# Patient Record
Sex: MALE | Race: WHITE | NOT HISPANIC OR LATINO | URBAN - METROPOLITAN AREA
[De-identification: names, ages, dates, MRNs, and addresses within clinical notes are randomized per-mention and may not be internally consistent; named-entity substitution may affect disease eponyms.]

---

## 2019-05-08 ENCOUNTER — EMERGENCY (EMERGENCY)
Facility: HOSPITAL | Age: 39
LOS: 1 days | Discharge: HOME | End: 2019-05-08
Attending: EMERGENCY MEDICINE | Admitting: EMERGENCY MEDICINE
Payer: SELF-PAY

## 2019-05-08 VITALS
DIASTOLIC BLOOD PRESSURE: 87 MMHG | HEART RATE: 100 BPM | TEMPERATURE: 98 F | SYSTOLIC BLOOD PRESSURE: 131 MMHG | RESPIRATION RATE: 18 BRPM | OXYGEN SATURATION: 100 %

## 2019-05-08 VITALS
HEART RATE: 76 BPM | TEMPERATURE: 99 F | SYSTOLIC BLOOD PRESSURE: 128 MMHG | RESPIRATION RATE: 16 BRPM | OXYGEN SATURATION: 100 % | DIASTOLIC BLOOD PRESSURE: 72 MMHG

## 2019-05-08 LAB
BASOPHILS # BLD AUTO: 0.07 K/UL — SIGNIFICANT CHANGE UP (ref 0–0.2)
BASOPHILS NFR BLD AUTO: 0.7 % — SIGNIFICANT CHANGE UP (ref 0–2)
EOSINOPHIL # BLD AUTO: 0.24 K/UL — SIGNIFICANT CHANGE UP (ref 0–0.5)
EOSINOPHIL NFR BLD AUTO: 2.3 % — SIGNIFICANT CHANGE UP (ref 0–6)
HCT VFR BLD CALC: 47.4 % — SIGNIFICANT CHANGE UP (ref 39–50)
HGB BLD-MCNC: 15.2 G/DL — SIGNIFICANT CHANGE UP (ref 13–17)
IMM GRANULOCYTES NFR BLD AUTO: 0.5 % — SIGNIFICANT CHANGE UP (ref 0–1.5)
LYMPHOCYTES # BLD AUTO: 19.3 % — SIGNIFICANT CHANGE UP (ref 13–44)
LYMPHOCYTES # BLD AUTO: 2.04 K/UL — SIGNIFICANT CHANGE UP (ref 1–3.3)
MCHC RBC-ENTMCNC: 29.2 PG — SIGNIFICANT CHANGE UP (ref 27–34)
MCHC RBC-ENTMCNC: 32.1 % — SIGNIFICANT CHANGE UP (ref 32–36)
MCV RBC AUTO: 91 FL — SIGNIFICANT CHANGE UP (ref 80–100)
MONOCYTES # BLD AUTO: 0.74 K/UL — SIGNIFICANT CHANGE UP (ref 0–0.9)
MONOCYTES NFR BLD AUTO: 7 % — SIGNIFICANT CHANGE UP (ref 2–14)
NEUTROPHILS # BLD AUTO: 7.42 K/UL — HIGH (ref 1.8–7.4)
NEUTROPHILS NFR BLD AUTO: 70.2 % — SIGNIFICANT CHANGE UP (ref 43–77)
NRBC # FLD: 0 K/UL — SIGNIFICANT CHANGE UP (ref 0–0)
PLATELET # BLD AUTO: 262 K/UL — SIGNIFICANT CHANGE UP (ref 150–400)
PMV BLD: 10.6 FL — SIGNIFICANT CHANGE UP (ref 7–13)
RBC # BLD: 5.21 M/UL — SIGNIFICANT CHANGE UP (ref 4.2–5.8)
RBC # FLD: 13 % — SIGNIFICANT CHANGE UP (ref 10.3–14.5)
WBC # BLD: 10.56 K/UL — HIGH (ref 3.8–10.5)
WBC # FLD AUTO: 10.56 K/UL — HIGH (ref 3.8–10.5)

## 2019-05-08 PROCEDURE — 99053 MED SERV 10PM-8AM 24 HR FAC: CPT

## 2019-05-08 PROCEDURE — 99283 EMERGENCY DEPT VISIT LOW MDM: CPT | Mod: 25

## 2019-05-08 PROCEDURE — 93010 ELECTROCARDIOGRAM REPORT: CPT

## 2019-05-08 RX ORDER — SODIUM CHLORIDE 9 MG/ML
1000 INJECTION INTRAMUSCULAR; INTRAVENOUS; SUBCUTANEOUS ONCE
Qty: 0 | Refills: 0 | Status: COMPLETED | OUTPATIENT
Start: 2019-05-08 | End: 2019-05-08

## 2019-05-08 RX ADMIN — SODIUM CHLORIDE 1000 MILLILITER(S): 9 INJECTION INTRAMUSCULAR; INTRAVENOUS; SUBCUTANEOUS at 23:03
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
## 2019-05-08 NOTE — ED PROVIDER NOTE - PROGRESS NOTE DETAILS
Pt was observed for 6 hours, was reassessed, feeling better, able to ambulate without any assistance, stable and ready to be discharged.
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
## 2019-05-08 NOTE — ED ADULT NURSE NOTE - NSIMPLEMENTINTERV_GEN_ALL_ED
Implemented All Universal Safety Interventions:  [place] to call system. Call bell, personal items and telephone within reach. Instruct patient to call for assistance. Room bathroom lighting operational. Non-slip footwear when patient is off stretcher. Physically safe environment: no spills, clutter or unnecessary equipment. Stretcher in lowest position, wheels locked, appropriate side rails in place.
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
## 2019-05-09 LAB
ALBUMIN SERPL ELPH-MCNC: 4.1 G/DL — SIGNIFICANT CHANGE UP (ref 3.3–5)
ALP SERPL-CCNC: 65 U/L — SIGNIFICANT CHANGE UP (ref 40–120)
ALT FLD-CCNC: 54 U/L — HIGH (ref 4–41)
ANION GAP SERPL CALC-SCNC: 13 MMO/L — SIGNIFICANT CHANGE UP (ref 7–14)
APAP SERPL-MCNC: < 15 UG/ML — LOW (ref 15–25)
AST SERPL-CCNC: 28 U/L — SIGNIFICANT CHANGE UP (ref 4–40)
BILIRUB SERPL-MCNC: < 0.2 MG/DL — LOW (ref 0.2–1.2)
BUN SERPL-MCNC: 23 MG/DL — SIGNIFICANT CHANGE UP (ref 7–23)
CALCIUM SERPL-MCNC: 9.4 MG/DL — SIGNIFICANT CHANGE UP (ref 8.4–10.5)
CHLORIDE SERPL-SCNC: 108 MMOL/L — HIGH (ref 98–107)
CO2 SERPL-SCNC: 22 MMOL/L — SIGNIFICANT CHANGE UP (ref 22–31)
CREAT SERPL-MCNC: 1.04 MG/DL — SIGNIFICANT CHANGE UP (ref 0.5–1.3)
ETHANOL BLD-MCNC: < 10 MG/DL — SIGNIFICANT CHANGE UP
GLUCOSE SERPL-MCNC: 135 MG/DL — HIGH (ref 70–99)
MAGNESIUM SERPL-MCNC: 2.1 MG/DL — SIGNIFICANT CHANGE UP (ref 1.6–2.6)
PHOSPHATE SERPL-MCNC: 3.7 MG/DL — SIGNIFICANT CHANGE UP (ref 2.5–4.5)
POTASSIUM SERPL-MCNC: 4.6 MMOL/L — SIGNIFICANT CHANGE UP (ref 3.5–5.3)
POTASSIUM SERPL-SCNC: 4.6 MMOL/L — SIGNIFICANT CHANGE UP (ref 3.5–5.3)
PROT SERPL-MCNC: 7.4 G/DL — SIGNIFICANT CHANGE UP (ref 6–8.3)
SALICYLATES SERPL-MCNC: < 5 MG/DL — LOW (ref 15–30)
SODIUM SERPL-SCNC: 143 MMOL/L — SIGNIFICANT CHANGE UP (ref 135–145)

## 2023-07-19 ENCOUNTER — EMERGENCY (EMERGENCY)
Facility: HOSPITAL | Age: 43
LOS: 1 days | Discharge: HOME | End: 2023-07-19
Payer: COMMERCIAL

## 2023-07-19 VITALS
WEIGHT: 315 LBS | HEART RATE: 101 BPM | TEMPERATURE: 98 F | OXYGEN SATURATION: 96 % | DIASTOLIC BLOOD PRESSURE: 83 MMHG | RESPIRATION RATE: 20 BRPM | SYSTOLIC BLOOD PRESSURE: 130 MMHG

## 2023-07-19 VITALS
DIASTOLIC BLOOD PRESSURE: 78 MMHG | TEMPERATURE: 98 F | OXYGEN SATURATION: 98 % | SYSTOLIC BLOOD PRESSURE: 119 MMHG | HEART RATE: 84 BPM | RESPIRATION RATE: 18 BRPM

## 2023-07-19 PROBLEM — I10 ESSENTIAL (PRIMARY) HYPERTENSION: Chronic | Status: ACTIVE | Noted: 2019-05-08

## 2023-07-19 PROCEDURE — 99283 EMERGENCY DEPT VISIT LOW MDM: CPT

## 2023-07-19 PROCEDURE — 99284 EMERGENCY DEPT VISIT MOD MDM: CPT

## 2023-07-19 RX ORDER — ACETAMINOPHEN 500 MG
975 TABLET ORAL ONCE
Refills: 0 | Status: COMPLETED | OUTPATIENT
Start: 2023-07-19 | End: 2023-07-19

## 2023-07-19 RX ORDER — IBUPROFEN 200 MG
600 TABLET ORAL ONCE
Refills: 0 | Status: COMPLETED | OUTPATIENT
Start: 2023-07-19 | End: 2023-07-19

## 2023-07-19 RX ORDER — LIDOCAINE 4 G/100G
1 CREAM TOPICAL ONCE
Refills: 0 | Status: COMPLETED | OUTPATIENT
Start: 2023-07-19 | End: 2023-07-19

## 2023-07-19 RX ADMIN — Medication 600 MILLIGRAM(S): at 17:06

## 2023-07-19 RX ADMIN — Medication 975 MILLIGRAM(S): at 17:05

## 2023-07-19 RX ADMIN — LIDOCAINE 1 PATCH: 4 CREAM TOPICAL at 17:08

## 2023-07-19 NOTE — ED ADULT NURSE NOTE - NSFALLUNIVINTERV_ED_ALL_ED
Bed/Stretcher in lowest position, wheels locked, appropriate side rails in place/Call bell, personal items and telephone in reach/Instruct patient to call for assistance before getting out of bed/chair/stretcher/Non-slip footwear applied when patient is off stretcher/[place] to call system/Physically safe environment - no spills, clutter or unnecessary equipment/Purposeful proactive rounding/Room/bathroom lighting operational, light cord in reach
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
## 2023-07-19 NOTE — ED ADULT NURSE NOTE - NSFALLRISKASMTTYPE_ED_ALL_ED
Returned patient phone call and left detailed voicemail message regarding rescheduling Botox appointment.   Initial (On Arrival)
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
## 2023-07-19 NOTE — ED PROVIDER NOTE - PHYSICAL EXAMINATION
CONSTITUTIONAL: Patient is awake, alert and oriented x 3. Patient is well appearing and in no acute distress  HEAD: NCAT  EYES: PERRL b/l, EOMI  NECK: supple, FROM  LUNGS: CTA b/l, no wheezing or rales. No ttp to the anterior, lateral or posterior chest wall   HEART: RRR.+S1S2 no murmurs  ABDOMEN: Soft, non-distended, nttp, no rebound or guarding  EXTREMITY: FROM upper and lower ext b/l. No midline cervical, thoracic or lumbar ttp. There is b/l paraspinal cervical and lumbar ttp   SKIN: with no rash or lesions  NEURO: Cn3-12 grossly intact. Strength 5/5 UE/LE b/l. Nml gross sensation UE/LE b/l

## 2023-07-19 NOTE — ED PROVIDER NOTE - NSFOLLOWUPINSTRUCTIONS_ED_ALL_ED_FT
**YOU HAVE DECIDED TO SIGN OUT AGAINST MEDICAL ADVISE**    1. Please follow up with your Primary Care Doctor after discharge, bring a copy of your results to follow up appointment     2. Please rest, stay hydrated and continue all at home medications as previously prescribed    3. For continued or recurrent pain recommend taking over the counter Tylenol (acetaminophen) 1000 mg every 6-8 hours as needed and/or over the counter Motrin (Ibuprofen/Advil) 600mg every 6 hours as needed. For additional relief of your pain your may use over the counter Lidocaine patches such as Salonpas.     4. Call [facility] - [phone] for further evaluation and management of your back pain     5. We have reached out to our care coordinators to help schedule appointment and you should expect a call in the next 24-48 hours to expedite appointment     6. Return to ED for any new or worsened symptoms of concern

## 2023-07-19 NOTE — ED PROVIDER NOTE - PATIENT PORTAL LINK FT
You can access the FollowMyHealth Patient Portal offered by [facility] by registering at the following website: [email or web address]. By joining [facility]’s FollowMyHealth portal, you will also be able to view your health information using other applications (apps) compatible with our system.

## 2023-07-19 NOTE — ED PROVIDER NOTE - PROGRESS NOTE DETAILS
Pt refusing to be examined by attending. Requesting paperwork, will sign out AMA.   The patient is AAOx3, not intoxicated, and displays normal decision making ability. We discussed all risks, benefits, and alternatives to the progression of treatment and the potential dangers of leaving including but not limited to permanent disability, injury, and death.  The patient was instructed that they are welcome to change their decision to leave against medical advice and return to the emergency department at any time and for any reason in order to allow us to render care.   Leanne Ring PA-C
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
## 2024-03-28 ENCOUNTER — EMERGENCY (EMERGENCY)
Facility: HOSPITAL | Age: 44
LOS: 1 days | Discharge: HOME | End: 2024-03-28
Admitting: EMERGENCY MEDICINE
Payer: COMMERCIAL

## 2024-03-28 VITALS
HEART RATE: 89 BPM | DIASTOLIC BLOOD PRESSURE: 107 MMHG | RESPIRATION RATE: 18 BRPM | OXYGEN SATURATION: 96 % | TEMPERATURE: 98 F | SYSTOLIC BLOOD PRESSURE: 163 MMHG

## 2024-03-28 PROCEDURE — 99283 EMERGENCY DEPT VISIT LOW MDM: CPT
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
## 2024-03-28 NOTE — ED PROVIDER NOTE - PATIENT PORTAL LINK FT
You can access the FollowMyHealth Patient Portal offered by [facility] by registering at the following website: [email or web address]. By joining [employer]’s FollowMyHealth portal, you will also be able to view your health information using other applications (apps) compatible with our system.
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
## 2024-05-15 ENCOUNTER — EMERGENCY (EMERGENCY)
Facility: HOSPITAL | Age: 44
LOS: 1 days | Discharge: HOME | End: 2024-05-15
Admitting: EMERGENCY MEDICINE
Payer: COMMERCIAL

## 2024-05-15 VITALS
SYSTOLIC BLOOD PRESSURE: 136 MMHG | TEMPERATURE: 99 F | RESPIRATION RATE: 18 BRPM | HEART RATE: 89 BPM | OXYGEN SATURATION: 99 % | DIASTOLIC BLOOD PRESSURE: 76 MMHG

## 2024-05-15 PROCEDURE — 99284 EMERGENCY DEPT VISIT MOD MDM: CPT

## 2024-05-15 RX ORDER — LIDOCAINE 4 G/100G
1 CREAM TOPICAL ONCE
Refills: 0 | Status: COMPLETED | OUTPATIENT
Start: 2024-05-15 | End: 2024-05-15

## 2024-05-15 RX ORDER — IBUPROFEN 200 MG
600 TABLET ORAL ONCE
Refills: 0 | Status: COMPLETED | OUTPATIENT
Start: 2024-05-15 | End: 2024-05-15

## 2024-05-15 RX ADMIN — Medication 600 MILLIGRAM(S): at 22:56

## 2024-05-15 RX ADMIN — LIDOCAINE 1 PATCH: 4 CREAM TOPICAL at 22:56

## 2024-05-15 NOTE — ED ADULT NURSE NOTE - IN THE PAST 12 MONTHS HAVE YOU USED DRUGS OTHER THAN THOSE REQUIRED FOR MEDICAL REASON?
The After Visit Summary was reviewed with the patient following their office visit with Rachna Holt PA-C. Patient was educated about any medication changes, the importance of following a low sodium diet (given low-sodium food resources and recipe book), importance of recording daily weights, and when to call [facility] clinic- he was advised to call on Friday, 10/26, with wt/sx update. Also advised pt to call if he has any issues getting colonoscopy scheduled sooner, which Rachna Holt PA-C recommends. Patient verbalized understanding of the information and agrees to call with any questions or concerns.     Labs: Lab results from today were reviewed with the patient during the office visit. A copy of the results were provided on the After Visit Summary.     Return appointment: Patient was scheduled for lab appointment on 10/29/18 at 9:00am for Hgb recheck and 2 week follow up with Rachna Holt PA-C on 11/6/18 at 2:10pm for lab and 3:10pm for [facility] visit.    Sun Walker RN  [facility] Care Coordinator          
No
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
## 2024-05-15 NOTE — ED PROVIDER NOTE - NSFOLLOWUPINSTRUCTIONS_ED_ALL_ED_FT
Follow up with your Doctor in 1-2 days.    Heat to back.    Take Ibuprofen 400mg orally every 6 hours as needed for pain take with food.    Return to the ER for any persistent/worsening or new symptoms, weakness, numbness, difficulty urinating or any concerning symptoms.
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
## 2024-05-15 NOTE — ED PROVIDER NOTE - PATIENT PORTAL LINK FT
You can access the FollowMyHealth Patient Portal offered by [facility] by registering at the following website: [email or web address]. By joining [employer]s FollowMyHealth portal, you will also be able to view your health information using other applications (apps) compatible with our system.

## 2024-05-15 NOTE — ED PROVIDER NOTE - PHYSICAL EXAMINATION
+TTP right sided paraspinal cervical region, no midline TTP, neck with FROM, strength 5/5 all extremities, sensation equal and intact.
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
## 2025-06-02 ENCOUNTER — EMERGENCY (EMERGENCY)
Facility: HOSPITAL | Age: 45
LOS: 1 days | End: 2025-06-02
Admitting: STUDENT IN AN ORGANIZED HEALTH CARE EDUCATION/TRAINING PROGRAM
Payer: OTHER MISCELLANEOUS

## 2025-06-02 VITALS
WEIGHT: 315 LBS | DIASTOLIC BLOOD PRESSURE: 69 MMHG | RESPIRATION RATE: 18 BRPM | HEART RATE: 59 BPM | SYSTOLIC BLOOD PRESSURE: 125 MMHG | OXYGEN SATURATION: 98 % | TEMPERATURE: 98 F

## 2025-06-02 PROCEDURE — 99283 EMERGENCY DEPT VISIT LOW MDM: CPT

## 2025-06-02 NOTE — ED PROVIDER NOTE - PATIENT PORTAL LINK FT
You can access the FollowMyHealth Patient Portal offered by [facility] by registering at the following website: [email or web address]. By joining [employer]’s FollowMyHealth portal, you will also be able to view your health information using other applications (apps) compatible with our system.

## 2025-06-02 NOTE — ED PROVIDER NOTE - PHYSICAL EXAMINATION
CONSTITUTIONAL: Well-appearing; well-nourished; in no apparent distress;  HEAD: Normocephalic, atraumatic;  EYES: PERRL, EOM intact, conjunctiva and sclera WNL;  NECK/LYMPH: Supple; non-tender;  CARD: Normal S1, S2; no murmurs, rubs, or gallops noted  RESP: Normal chest excursion with respiration; breath sounds clear and equal bilaterally; no wheezes, rhonchi, or rales noted  EXT/MS: no visible deformity, no erythema/bruising. + ttp along IT band of R lower extremity. no bony ttp of R hip. no midline c/t/l spine ttp. moves all extremities; distal pulses are normal, no pedal edema  SKIN: Normal for age and race; warm; dry; good turgor; no apparent lesions or exudate noted  NEURO: Awake, alert, oriented x 3, no gross deficits, CN II-XII grossly intact, no motor or sensory deficit noted; ambulatory with steady gait.  PSYCH: Normal mood; appropriate affect

## 2025-06-02 NOTE — ED ADULT TRIAGE NOTE - CHIEF COMPLAINT QUOTE
Pt is c/o right leg and lower back and neck pain since 1pm after a fall from the  truck while at work. Denies hitting head or LOC. Offers no other complaints.

## 2025-06-02 NOTE — ED PROVIDER NOTE - NSFOLLOWUPINSTRUCTIONS_ED_ALL_ED_FT
You were seen in our department for Right lower extremity pain  it is likely a contusion of your IT band of your leg.  Rest, Ice (first 72 hours, then heat), compress, elevate.   Take Tylenol up to 1000mg every 4-6 hours as needed for mild pain.  Take Ibuprofen 400mg, or may take max 600mg, every 6-8 hours with food as needed for moderate pain.  Follow up with your PMD in 48-72 hours for further monitoring.  Follow up with an orthopedic within 1 week for further evaluation.  If you have any difficulty obtaining an appointment with a consultant/specialist, please call [phone] to speak with a discharge coordinator for assistance.   if you develop any chest pain, dizziness, high fevers, weakness, numbness, tingling, vision changes, or any worsening symptoms return to our ED for evaluation.

## 2025-06-02 NOTE — ED PROVIDER NOTE - CLINICAL SUMMARY MEDICAL DECISION MAKING FREE TEXT BOX
44yoM PMH HTN, p/w right leg pain s/p fall off truck at work today. occurred at 1PM today, continued to work after. states he was working on the truck when someone pulled out a large item from the truck and he fell off the truck through the opening and landed on his right lateral thigh region. pt able to ambulate after the incident. took no meds for pain. denies any back pain, numbness, tingling, chest pain, sob, head trauma, loc or blood thinners.   on exam, no bony ttp, + ttp along IT band of RLE.  offered screening xray, and analgesics  pt deferred both, would prefer to follow up with his chiropractor for further evaluation.